# Patient Record
Sex: MALE | Race: WHITE | NOT HISPANIC OR LATINO | Employment: OTHER | URBAN - METROPOLITAN AREA
[De-identification: names, ages, dates, MRNs, and addresses within clinical notes are randomized per-mention and may not be internally consistent; named-entity substitution may affect disease eponyms.]

---

## 2020-02-20 PROBLEM — N13.5 UPJ (URETEROPELVIC JUNCTION) OBSTRUCTION: Status: ACTIVE | Noted: 2020-02-20

## 2020-02-20 PROBLEM — I50.21 ACUTE SYSTOLIC HEART FAILURE: Status: ACTIVE | Noted: 2020-02-20

## 2020-02-20 PROBLEM — F19.10 POLYSUBSTANCE ABUSE: Status: ACTIVE | Noted: 2020-02-20

## 2020-02-20 PROBLEM — J11.1 INFLUENZA IN ADULT: Status: ACTIVE | Noted: 2020-02-20

## 2020-02-20 PROBLEM — I50.9 NEW ONSET OF CONGESTIVE HEART FAILURE: Status: ACTIVE | Noted: 2020-02-20

## 2020-02-20 PROBLEM — R74.01 TRANSAMINITIS: Status: ACTIVE | Noted: 2020-02-20

## 2020-02-20 PROBLEM — J18.9 CAP (COMMUNITY ACQUIRED PNEUMONIA): Status: ACTIVE | Noted: 2020-02-20

## 2020-02-20 PROBLEM — J44.9 COPD (CHRONIC OBSTRUCTIVE PULMONARY DISEASE): Status: ACTIVE | Noted: 2020-02-20

## 2020-02-21 PROBLEM — K56.609 SBO (SMALL BOWEL OBSTRUCTION): Status: ACTIVE | Noted: 2020-02-21

## 2020-02-21 PROBLEM — R74.8 ELEVATED LIVER ENZYMES: Status: ACTIVE | Noted: 2020-02-20

## 2020-02-21 PROBLEM — R76.8 HEPATITIS C ANTIBODY POSITIVE IN BLOOD: Status: ACTIVE | Noted: 2020-02-21

## 2020-02-24 PROBLEM — K56.609 SBO (SMALL BOWEL OBSTRUCTION): Status: RESOLVED | Noted: 2020-02-21 | Resolved: 2020-02-24

## 2020-02-24 PROBLEM — I50.21 ACUTE SYSTOLIC HEART FAILURE: Status: RESOLVED | Noted: 2020-02-20 | Resolved: 2020-02-24

## 2020-02-24 PROBLEM — E87.6 HYPOKALEMIA: Status: RESOLVED | Noted: 2020-02-24 | Resolved: 2020-02-24

## 2020-02-24 PROBLEM — E87.6 HYPOKALEMIA: Status: ACTIVE | Noted: 2020-02-24

## 2020-03-20 PROBLEM — I05.9 MITRAL VALVE DISORDER: Status: ACTIVE | Noted: 2020-03-20

## 2020-03-20 PROBLEM — I42.0 DILATED IDIOPATHIC CARDIOMYOPATHY: Status: ACTIVE | Noted: 2020-03-20

## 2020-03-20 PROBLEM — B19.20 HEPATITIS C: Status: ACTIVE | Noted: 2020-02-21

## 2020-03-20 PROBLEM — I50.22 CHRONIC SYSTOLIC (CONGESTIVE) HEART FAILURE: Status: ACTIVE | Noted: 2020-02-20

## 2020-03-20 PROBLEM — R94.31 ABNORMAL ELECTROCARDIOGRAM (ECG) (EKG): Status: ACTIVE | Noted: 2020-03-20

## 2020-03-20 PROBLEM — E61.1 IRON DEFICIENCY: Status: ACTIVE | Noted: 2020-03-20

## 2020-03-20 PROBLEM — I35.9 AORTIC VALVE DISORDER: Status: ACTIVE | Noted: 2020-03-20

## 2020-03-23 PROBLEM — G89.4 CHRONIC PAIN SYNDROME: Status: ACTIVE | Noted: 2020-03-23

## 2020-03-23 PROBLEM — G47.9 SLEEP DISTURBANCE: Status: ACTIVE | Noted: 2020-03-23

## 2022-04-07 ENCOUNTER — SPECIALTY PHARMACY (OUTPATIENT)
Dept: PHARMACY | Facility: CLINIC | Age: 67
End: 2022-04-07

## 2022-04-07 DIAGNOSIS — B18.2 CHRONIC HEPATITIS C WITHOUT HEPATIC COMA: Primary | ICD-10-CM

## 2022-04-13 ENCOUNTER — SPECIALTY PHARMACY (OUTPATIENT)
Dept: PHARMACY | Facility: CLINIC | Age: 67
End: 2022-04-13

## 2022-04-13 DIAGNOSIS — B18.2 CHRONIC HEPATITIS C WITHOUT HEPATIC COMA: Primary | ICD-10-CM

## 2022-04-13 NOTE — TELEPHONE ENCOUNTER
Specialty Pharmacy - Initial Clinical Assessment  Specialty Medication Orders Linked to Encounter    Flowsheet Row Most Recent Value   Medication #1 sofosbuvir-velpatasvir 400-100 mg Tab (Order#[id number], Rx#[id number])        Patient Diagnosis   B18.2 - Chronic hepatitis C without hepatic coma    Subjective    Tanner Leon is a 66 y.o. male, who is followed by the specialty pharmacy service for management and education.    Recent Encounters     Date Type Provider Description    04/13/2022 Specialty Pharmacy Radha Flores PharmD Initial Clinical Assessment    04/07/2022 Specialty Pharmacy Khris Iverson Referral Authorization        Clinical call attempts since last clinical assessment   No call attempts found.     Current Outpatient Medications   Medication Sig    buprenorphine HCL (SUBUTEX) 8 mg Subl Place 16 mg under the tongue daily as needed.    buPROPion (WELLBUTRIN XL) 300 MG 24 hr tablet Take 300 mg by mouth once daily.    dorzolamide (TRUSOPT) 2 % ophthalmic solution Place into both eyes.    albuterol (PROVENTIL HFA) 90 mcg/actuation inhaler Inhale 2 puffs into the lungs every 6 (six) hours as needed for Wheezing. Rescue    latanoprost 0.005 % ophthalmic solution Place into both eyes.    sofosbuvir-velpatasvir 400-100 mg Tab Take 1 tablet by mouth once daily.    SYMBICORT 160-4.5 mcg/actuation HFAA INHALE 2 PUFFS BY MOUTH TWICE DAILY (RINSE MOUTH WELL WITH WATER AFTER USE)   Last reviewed on 4/13/2022  1:16 PM by Radha Flores, PharmD    Review of patient's allergies indicates:  No Known AllergiesLast reviewed on  4/13/2022 1:16 PM by Radha Flores    Drug Interactions    Drug interactions evaluated: yes  Clinically relevant drug interactions identified: no  Provided the patient with educational material regarding drug interactions: not applicable         Adverse Effects    *All other systems reviewed and are negative       Assessment Questions - Documented Responses    Flowsheet Row Most Recent  Value   Assessment    Medication Reconciliation completed for patient Yes   During the past 4 weeks, has patient missed any activities due to condition or medication? No   During the past 4 weeks, did patient have any of the following urgent care visits? None   Goals of Therapy Status Discussed (new start)   Status of the patients ability to self-administer: Is Able   All education points have been covered with patient? Yes, supplemental printed education provided   Welcome packet contents reviewed and discussed with patient? Yes   Assesment completed? Yes   Plan Therapy being initiated   Do you need to open a clinical intervention (i-vent)? No   Do you want to schedule first shipment? Yes   Medication #1 Assessment Info    Patient status New to [facility], New medication   Is this medication appropriate for the patient? Yes   Is this medication effective? Not yet started        Refill Questions - Documented Responses    Flowsheet Row Most Recent Value   Patient Availability and HIPAA Verification    Does patient want to proceed with activity? Yes   HIPAA/medical authority confirmed? Yes   Relationship to patient of person spoken to? Self   Refill Screening Questions    When does the patient need to receive the medication? 04/15/22   Refill Delivery Questions    How will the patient receive the medication? Mail   When does the patient need to receive the medication? 04/15/22   Shipping Address Home   Address in [facility] confirmed and updated if neccessary? Yes   Expected Copay ($) 0   Is the patient able to afford the medication copay? Yes   Payment Method zero copay   Days supply of Refill 28   Supplies needed? No supplies needed   Refill activity completed? Yes   Refill activity plan Refill scheduled   Shipment/Pickup Date: 04/13/22          Objective    He has a past medical history of CHF (congestive heart failure), Chronic hepatitis C without hepatic coma, and COPD (chronic obstructive pulmonary  "disease).    Tried/failed medications: none    BP Readings from Last 4 Encounters:   03/23/22 (!) 146/82   03/20/20 110/75   02/24/20 129/84   02/20/20 123/88     Ht Readings from Last 4 Encounters:   03/23/22 5' 11" (1.803 m)   03/20/20 5' 11" (1.803 m)   02/24/20 5' 11" (1.803 m)     Wt Readings from Last 4 Encounters:   03/23/22 54.3 kg (119 lb 12.8 oz)   03/20/20 56 kg (123 lb 8 oz)   02/24/20 55.8 kg (123 lb)     Recent Labs   Lab Result Units 03/23/22  1613   Creatinine mg/dL 0.86   ALT U/L 35   AST U/L 26   Hepatitis B Surface Ag  Negative   Hep B S Ab  Positive A   Hep B Core Total Ab  Positive A   Pertinent labs within EMR reviewed.   Epclusa x 12 weeks is appropriate per AASLD Guidelines.     The goals of Hepatitis C Virus (HCV) infection treatment include:  · Reducing all-cause mortality and liver-related health adverse consequences, including cirrhosis, end-stage liver disease, and hepatocellular carcinoma  · Achieving virologic cure as evidenced by a sustained virologic response  · Improving or maintaining quality of life  · Maintaining optimal therapy adherence  · Minimizing and managing side effects  · Preventing the transmission of HCV    Goals of Therapy Status: Discussed (new start)    Assessment/Plan  Patient plans to start therapy on 04/15/22. Indication, dosage, appropriateness, effectiveness, safety and convenience of his specialty medication(s) were reviewed today.     Patient Education   Patient received education on the following:    Expectations and possible outcomes of therapy   Proper use, timely administration, and missed dose management   Duration of therapy   Side effects, including prevention, minimization, and management   Contraindications and safety precautions   New or changed medications, including prescribe and over the counter medications and supplements   Reviews recommended vaccinations, as appropriate   Storage, safe handling, and disposal    Tasks added this " encounter   5/6/2022 - Refill Call (Auto Added)  4/22/2022 - 7 Day Post Start Touchbase   Tasks due within next 3 months   No tasks due.     Radha Flores, PharmD  [facility]  [address] [place] [id number]  Phone: [phone]  Fax: [phone]

## 2022-04-22 ENCOUNTER — SPECIALTY PHARMACY (OUTPATIENT)
Dept: PHARMACY | Facility: CLINIC | Age: 67
End: 2022-04-22

## 2022-04-22 DIAGNOSIS — B18.2 CHRONIC HEPATITIS C WITHOUT HEPATIC COMA: Primary | ICD-10-CM

## 2022-04-22 NOTE — TELEPHONE ENCOUNTER
7 Day Touchbase - Documented Responses    Flowsheet Row Most Recent Value   Have you started taking your medication? Yes   What day did you start? 04/15/22   How are you feeling?  pt feels fine. denied side effects or missed doses   How are you taking your medication? Are you having any problems taking your medication? daily. no problems or concerns   Do you have any questions or concerns that we can help you with today? none. denied changes to med list, allergies, or health conditions.  informed [facility] will f/u in 2 weeks for epclusa refill 2 of 3          Radha Flores, PharmD  [facility] - Specialty Pharmacy  [address] [facility] [address]  Phone: [phone]  Fax: [phone]

## 2022-05-06 ENCOUNTER — SPECIALTY PHARMACY (OUTPATIENT)
Dept: PHARMACY | Facility: CLINIC | Age: 67
End: 2022-05-06

## 2022-05-06 NOTE — TELEPHONE ENCOUNTER
Specialty Pharmacy - Refill Coordination    Specialty Medication Orders Linked to Encounter    Flowsheet Row Most Recent Value   Medication #1 sofosbuvir-velpatasvir 400-100 mg Tab (Order#[id number], Rx#[id number])        AG Epclusa  refill (2 of 3) confirmed and reassessment complete. Confirmed 2 patient identifiers - name and . Therapy appropriate.     Patient has  6  doses of Epclusa remaining and takes it around 9am daily. Pt reports they are not having any side effects so far. No missed doses, no new medications, no new allergies or health conditions reported at this time. Allergies reviewed and medication reconciliation complete (reviewed and documented in [facility] and [facility]). Disease education reviewed (including transmission and prevention). Patient counseled on importance of maintaining adherence and keeping lab appointments which were scheduled. All questions answered and addressed to patients satisfaction. Advised to call [facility] and provider if any issues arise.  Pt verbalized understanding.     Refill Questions - Documented Responses    Flowsheet Row Most Recent Value   Patient Availability and HIPAA Verification    Does patient want to proceed with activity? Yes   HIPAA/medical authority confirmed? Yes   Relationship to patient of person spoken to? Self   Refill Screening Questions    Changes to allergies? No   Changes to medications? No   New conditions since last clinic visit? No   Unplanned office visit, urgent care, ED, or hospital admission in the last 4 weeks? No   How does patient/caregiver feel medication is working? Too soon to tell   Financial problems or insurance changes? No   How many doses of your specialty medications were missed in the last 4 weeks? 0   Would patient like to speak to a pharmacist? No   When does the patient need to receive the medication? 22   Refill Delivery Questions    How will the patient receive the medication? Mail   When does the patient  need to receive the medication? 05/13/22   Shipping Address Prescription   Address in [facility] confirmed and updated if neccessary? Yes   Expected Copay ($) 0   Is the patient able to afford the medication copay? Yes   Payment Method zero copay   Days supply of Refill 28   Supplies needed? No supplies needed   Refill activity completed? Yes   Refill activity plan Refill scheduled   Shipment/Pickup Date: 05/09/22          Current Outpatient Medications   Medication Sig    albuterol (PROVENTIL HFA) 90 mcg/actuation inhaler Inhale 2 puffs into the lungs every 6 (six) hours as needed for Wheezing. Rescue    buprenorphine HCL (SUBUTEX) 8 mg Subl Place 16 mg under the tongue daily as needed.    buPROPion (WELLBUTRIN XL) 300 MG 24 hr tablet Take 300 mg by mouth once daily.    dorzolamide (TRUSOPT) 2 % ophthalmic solution Place into both eyes.    latanoprost 0.005 % ophthalmic solution Place into both eyes.    sofosbuvir-velpatasvir 400-100 mg Tab Take 1 tablet by mouth once daily.    SYMBICORT 160-4.5 mcg/actuation HFAA INHALE 2 PUFFS BY MOUTH TWICE DAILY (RINSE MOUTH WELL WITH WATER AFTER USE)   Last reviewed on 4/13/2022  1:16 PM by Radha Flores PharmD    Review of patient's allergies indicates:  No Known Allergies Last reviewed on  4/13/2022 1:16 PM by Radha Flores      Tasks added this encounter   No tasks added.   Tasks due within next 3 months   5/6/2022 - Refill Call (Auto Added)     Nacho Hunt, AnkitD  [facility] - Specialty Pharmacy  [address] [place] [address]  Phone: [phone]  Fax: [phone]

## 2022-06-03 ENCOUNTER — SPECIALTY PHARMACY (OUTPATIENT)
Dept: PHARMACY | Facility: CLINIC | Age: 67
End: 2022-06-03

## 2022-06-03 DIAGNOSIS — B18.2 CHRONIC HEPATITIS C WITHOUT HEPATIC COMA: Primary | ICD-10-CM

## 2022-06-03 NOTE — TELEPHONE ENCOUNTER
Specialty Pharmacy - Refill Coordination  EPCLUSA REFILL 3 OF 3  Specialty Medication Orders Linked to Encounter    Flowsheet Row Most Recent Value   Medication #1 sofosbuvir-velpatasvir 400-100 mg Tab (Order#[id number], Rx#[id number])        Epclusa refill (3 of 3) confirmed and reassessment complete. Confirmed 2 patient identifiers - name and . Therapy appropriate.     Patient has a few doses of Epclusa remaining and takes it daily. Pt reports they are not having any side effects so far. No missed doses, no new medications, no new allergies or health conditions reported at this time. Pt has hernia surgery scheduled soon--instructed to continue epclusa throughout outpatient procedures- do not miss doses.  Allergies reviewed and medication reconciliation complete (reviewed and documented in [facility] and [facility]). Disease education reviewed (including transmission and prevention). Patient counseled on importance of maintaining adherence and keeping lab appointments which were scheduled. Reviewed SVR-12 drawn in May came back undetected and encouraged strong adherence for the remainder of therapy. All questions answered and addressed to patients satisfaction. Advised to call [facility] and provider if any issues arise.  Pt verbalized understanding.     Refill Questions - Documented Responses    Flowsheet Row Most Recent Value   Patient Availability and HIPAA Verification    Does patient want to proceed with activity? Yes   HIPAA/medical authority confirmed? Yes   Relationship to patient of person spoken to? Self   Refill Screening Questions    Changes to allergies? No   Changes to medications? No   New conditions since last clinic visit? No   Unplanned office visit, urgent care, ED, or hospital admission in the last 4 weeks? No   How does patient/caregiver feel medication is working? Good   Financial problems or insurance changes? No   How many doses of your specialty medications were missed in the last 4  weeks? 0   Would patient like to speak to a pharmacist? No   When does the patient need to receive the medication? 06/06/22   Refill Delivery Questions    How will the patient receive the medication? Mail   When does the patient need to receive the medication? 06/06/22   Shipping Address Prescription   Address in [facility] confirmed and updated if neccessary? Yes   Expected Copay ($) 0   Is the patient able to afford the medication copay? Yes   Payment Method zero copay   Days supply of Refill 28   Supplies needed? No supplies needed   Refill activity completed? Yes   Refill activity plan Refill scheduled   Shipment/Pickup Date: 06/06/22          Current Outpatient Medications   Medication Sig    albuterol (PROVENTIL HFA) 90 mcg/actuation inhaler Inhale 2 puffs into the lungs every 6 (six) hours as needed for Wheezing. Rescue    buprenorphine HCL (SUBUTEX) 8 mg Subl Place 16 mg under the tongue daily as needed.    buPROPion (WELLBUTRIN XL) 300 MG 24 hr tablet Take 300 mg by mouth once daily.    dorzolamide (TRUSOPT) 2 % ophthalmic solution Place into both eyes.    latanoprost 0.005 % ophthalmic solution Place into both eyes.    nicotine polacrilex 4 MG Lozg Take by mouth.    sofosbuvir-velpatasvir 400-100 mg Tab Take 1 tablet by mouth once daily.    SYMBICORT 160-4.5 mcg/actuation HFAA INHALE 2 PUFFS BY MOUTH TWICE DAILY (RINSE MOUTH WELL WITH WATER AFTER USE)   Last reviewed on 6/3/2022 11:55 AM by Radha Flores, Khris    Review of patient's allergies indicates:  No Known Allergies Last reviewed on  6/3/2022 11:55 AM by Radha Flores      Tasks added this encounter   No tasks added.   Tasks due within next 3 months   No tasks due.     Radha Flores, PharmD  [facility] - Specialty Pharmacy  [address] [place] [address]  Phone: [phone]  Fax: [phone]

## 2024-10-15 ENCOUNTER — PATIENT MESSAGE (OUTPATIENT)
Dept: RESEARCH | Facility: HOSPITAL | Age: 69
End: 2024-10-15

## 2024-10-23 ENCOUNTER — PATIENT MESSAGE (OUTPATIENT)
Dept: RESEARCH | Facility: HOSPITAL | Age: 69
End: 2024-10-23